# Patient Record
(demographics unavailable — no encounter records)

---

## 2025-03-19 NOTE — PHYSICAL EXAM
[NL (40)] : plantar flexion 40 degrees [NL 30)] : inversion 30 degrees [NL (20)] : eversion 20 degrees [5___] : eversion 5[unfilled]/5 [2+] : posterior tibialis pulse: 2+ [Normal] : saphenous nerve sensation normal [] : mildly antalgic [Left] : left foot [Weight -] : weightbearing [There are no fractures, subluxations or dislocations. No significant abnormalities are seen] : There are no fractures, subluxations or dislocations. No significant abnormalities are seen [FreeTextEntry8] : Minimal tenderness.  [FreeTextEntry9] :  AP and mortise xrays of the ankle were taken and reviewed today. [de-identified] :  AP, lateral and oblique xray views were taken and reviewed today.

## 2025-03-19 NOTE — HISTORY OF PRESENT ILLNESS
[de-identified] : Patient is a 12-year-old female complaining of right foot/ankle pain. Denies trauma but developed pain after a game of lacrosse on 3/15/25. WB in [facility]. No formal treatment to date. History of left calcaneal stress fracture 2022.  [FreeTextEntry1] : left  foot pain

## 2025-03-19 NOTE — DISCUSSION/SUMMARY
[de-identified] : WBAT in supportive footwear. Ice to affected area. NSAIDS prn. Activity modification but can increase activities as tolerated.  If no significant improvement over next two weeks, RTO.